# Patient Record
Sex: FEMALE | Race: WHITE | NOT HISPANIC OR LATINO | Employment: FULL TIME | URBAN - METROPOLITAN AREA
[De-identification: names, ages, dates, MRNs, and addresses within clinical notes are randomized per-mention and may not be internally consistent; named-entity substitution may affect disease eponyms.]

---

## 2023-11-01 ENCOUNTER — OFFICE VISIT (OUTPATIENT)
Dept: CARDIOLOGY | Facility: CLINIC | Age: 38
End: 2023-11-01
Payer: COMMERCIAL

## 2023-11-01 VITALS
HEART RATE: 96 BPM | DIASTOLIC BLOOD PRESSURE: 72 MMHG | WEIGHT: 132.25 LBS | BODY MASS INDEX: 22.58 KG/M2 | SYSTOLIC BLOOD PRESSURE: 98 MMHG | HEIGHT: 64 IN

## 2023-11-01 DIAGNOSIS — R42 DIZZINESS: Primary | ICD-10-CM

## 2023-11-01 PROCEDURE — 3008F PR BODY MASS INDEX (BMI) DOCUMENTED: ICD-10-PCS | Mod: CPTII,S$GLB,, | Performed by: INTERNAL MEDICINE

## 2023-11-01 PROCEDURE — 3074F PR MOST RECENT SYSTOLIC BLOOD PRESSURE < 130 MM HG: ICD-10-PCS | Mod: CPTII,S$GLB,, | Performed by: INTERNAL MEDICINE

## 2023-11-01 PROCEDURE — 3074F SYST BP LT 130 MM HG: CPT | Mod: CPTII,S$GLB,, | Performed by: INTERNAL MEDICINE

## 2023-11-01 PROCEDURE — 1159F MED LIST DOCD IN RCRD: CPT | Mod: CPTII,S$GLB,, | Performed by: INTERNAL MEDICINE

## 2023-11-01 PROCEDURE — 3078F PR MOST RECENT DIASTOLIC BLOOD PRESSURE < 80 MM HG: ICD-10-PCS | Mod: CPTII,S$GLB,, | Performed by: INTERNAL MEDICINE

## 2023-11-01 PROCEDURE — 99203 PR OFFICE/OUTPT VISIT, NEW, LEVL III, 30-44 MIN: ICD-10-PCS | Mod: S$GLB,,, | Performed by: INTERNAL MEDICINE

## 2023-11-01 PROCEDURE — 99999 PR PBB SHADOW E&M-NEW PATIENT-LVL V: ICD-10-PCS | Mod: PBBFAC,,, | Performed by: INTERNAL MEDICINE

## 2023-11-01 PROCEDURE — 99999 PR PBB SHADOW E&M-NEW PATIENT-LVL V: CPT | Mod: PBBFAC,,, | Performed by: INTERNAL MEDICINE

## 2023-11-01 PROCEDURE — 3078F DIAST BP <80 MM HG: CPT | Mod: CPTII,S$GLB,, | Performed by: INTERNAL MEDICINE

## 2023-11-01 PROCEDURE — 1159F PR MEDICATION LIST DOCUMENTED IN MEDICAL RECORD: ICD-10-PCS | Mod: CPTII,S$GLB,, | Performed by: INTERNAL MEDICINE

## 2023-11-01 PROCEDURE — 93000 EKG 12-LEAD: ICD-10-PCS | Mod: S$GLB,,, | Performed by: GENERAL PRACTICE

## 2023-11-01 PROCEDURE — 99203 OFFICE O/P NEW LOW 30 MIN: CPT | Mod: S$GLB,,, | Performed by: INTERNAL MEDICINE

## 2023-11-01 PROCEDURE — 3008F BODY MASS INDEX DOCD: CPT | Mod: CPTII,S$GLB,, | Performed by: INTERNAL MEDICINE

## 2023-11-01 PROCEDURE — 93000 ELECTROCARDIOGRAM COMPLETE: CPT | Mod: S$GLB,,, | Performed by: GENERAL PRACTICE

## 2023-11-01 RX ORDER — SOLIFENACIN SUCCINATE 5 MG/1
5 TABLET, FILM COATED ORAL
COMMUNITY
Start: 2023-02-17 | End: 2024-02-17

## 2023-11-01 RX ORDER — LANOLIN ALCOHOL/MO/W.PET/CERES
CREAM (GRAM) TOPICAL
COMMUNITY

## 2023-11-01 RX ORDER — ARIPIPRAZOLE 10 MG/1
TABLET ORAL
COMMUNITY

## 2023-11-01 RX ORDER — FAMOTIDINE 40 MG/1
TABLET, FILM COATED ORAL
COMMUNITY

## 2023-11-01 RX ORDER — BUPROPION HYDROCHLORIDE 150 MG/1
TABLET ORAL
COMMUNITY

## 2023-11-01 RX ORDER — DEXTROAMPHETAMINE SACCHARATE, AMPHETAMINE ASPARTATE MONOHYDRATE, DEXTROAMPHETAMINE SULFATE, AMPHETAMINE SULFATE 6.25; 6.25; 6.25; 6.25 MG/1; MG/1; MG/1; MG/1
CAPSULE, EXTENDED RELEASE ORAL
COMMUNITY

## 2023-11-01 RX ORDER — TIZANIDINE 4 MG/1
4 TABLET ORAL NIGHTLY PRN
COMMUNITY
Start: 2023-02-10

## 2023-11-01 RX ORDER — SUMATRIPTAN 50 MG/1
TABLET, FILM COATED ORAL
COMMUNITY

## 2023-11-01 RX ORDER — ESCITALOPRAM OXALATE 20 MG/1
40 TABLET ORAL NIGHTLY
COMMUNITY

## 2023-11-01 RX ORDER — TIRZEPATIDE 7.5 MG/.5ML
INJECTION, SOLUTION SUBCUTANEOUS
COMMUNITY
Start: 2023-10-16

## 2023-11-01 NOTE — PROGRESS NOTES
"Subjective:    Patient ID:  Catarina Oliva is a 38 y.o. female patient here for evaluation Dizziness and Pre Syncope      History of Present Illness:     38-year-old female came here for evaluation of dizziness and near syncopal episodes.  Patient with a past medical history of bipolar disorder, ADHD, PE currently not on anticoagulation.  Patient was following up with cardiology in [facility].  She had recent follow up and echocardiogram, cardiac monitor and blood work was ordered.  She had a cardiac telemetry in August which showed sinus tachycardia and possible SVT and cardiac monitor was recently reordered but she did not finish the test completely and will be returning it soon for interpretation.  She has postural dizziness for last few months mainly when standing from sitting/lying down position.  Blood pressure usually runs low at baseline.  She had 1 episode of syncope because of the dizziness couple of months ago.  Denies any history of heart disease in the past.  she denied any palpitations.        Review of patient's allergies indicates:   Allergen Reactions    Chlorphen-phenyleph-hydrocodon Itching and Other (See Comments)     Hallucinates    Etonogestrel-ethinyl estradiol Other (See Comments)     nuvaring      Meperidine Hives    Fluoxetine Other (See Comments) and Palpitations     Rapid heart rate      Sertraline hcl Other (See Comments) and Palpitations     Rapid heart rate      Silicone Dermatitis and Rash       No past medical history on file.  No past surgical history on file.  Social History     Tobacco Use    Smoking status: Never     Passive exposure: Never    Smokeless tobacco: Never        Review of Systems   Negative except as mentioned in HPI         Objective        Vitals:    11/01/23 1149   BP: 98/72   Pulse: 96       LIPIDS - LAST 2   No results found for: "CHOL", "HDL", "LDLCALC", "TRIG", "CHOLHDL"    CBC - LAST 2  No results found for: "WBC", "RBC", "HGB", "HCT", "MCV", " ""MCH", "MCHC", "RDW", "PLT", "MPV", "GRAN", "LYMPH", "MONO", "BASO", "NRBC"    CHEMISTRY & LIVER FUNCTION - LAST 2  No results found for: "NA", "K", "CL", "CO2", "ANIONGAP", "BUN", "CREATININE", "GLU", "CALCIUM", "PH", "MG", "ALBUMIN", "PROT", "ALKPHOS", "ALT", "AST", "BILITOT"     CARDIAC PROFILE - LAST 2  No results found for: "BNP", "CPK", "CPKMB", "LDH", "TROPONINI", "TROPONINIHS"     COAGULATION - LAST 2  No results found for: "LABPT", "INR", "APTT"    ENDOCRINE & PSA - LAST 2  No results found for: "HGBA1C", "MICROALBUR", "TSH", "PROCAL", "PSA"     ECHOCARDIOGRAM RESULTS  No results found for this or any previous visit.      CURRENT/PREVIOUS VISIT EKG  No results found for this or any previous visit.  No valid procedures specified.   No results found for this or any previous visit.    No valid procedures specified.          PREVIOUS STRESS TEST              PREVIOUS ANGIOGRAM        PHYSICAL EXAM    CONSTITUTIONAL: Well built, well nourished in no apparent distress  HEENT: No pallor  NECK: no JVD  LUNGS: CTA b/l  HEART: regular rate and rhythm, S1, S2 normal, no murmur   ABDOMEN: soft  EXTREMITIES: No edema  NEURO: AAO X 3   SKIN:  No rash  Psych:  Normal affect    I HAVE REVIEWED :    The vital signs, nurses notes, and all the pertinent radiology and labs.        Current Outpatient Medications   Medication Instructions    ARIPiprazole (ABILIFY) 10 MG Tab No dose, route, or frequency recorded.    buPROPion (WELLBUTRIN XL) 150 MG TB24 tablet bupropion HCl  mg 24 hr tablet, extended release    dextroamphetamine-amphetamine (MYDAYIS) 25 mg CT24 Mydayis 25 mg capsule extended release 24 hr   TAKE 1 CAPSULE BY MOUTH EVERY DAY IN THE MORNING    EScitalopram oxalate (LEXAPRO) 40 mg, Oral, Nightly    famotidine (PEPCID) 40 MG tablet famotidine 40 mg tablet   Take 1 tablet every day by oral route in the morning.    magnesium oxide (MAG-OX) 400 mg (241.3 mg magnesium) tablet No dose, route, or frequency recorded. "    MOUNJARO 7.5 mg/0.5 mL PnIj Subcutaneous    solifenacin (VESICARE) 5 mg, Oral    sumatriptan (IMITREX) 50 MG tablet Imitrex 50 mg tablet   Take 1 tablet at the onset of headache, repeat the dose in 2 hours if symptoms persist, no more than 2 tabs in 24 hours    tiZANidine (ZANAFLEX) 4 mg, Oral, Nightly PRN        ECG reviewed by me: NSR, low voltage   Assessment & Plan     38-year-old female came here for evaluation of dizziness and near syncopal episodes.  Patient with a past medical history of bipolar disorder, ADHD, PE currently not on anticoagulation.  Patient was following up with cardiology in [facility].  She had recent follow up and echocardiogram, cardiac monitor and blood work was ordered.  She had a cardiac telemetry in August which showed sinus tachycardia and possible SVT and cardiac monitor was recently reordered but she did not finish the test completely and will be returning it soon for interpretation.  She has postural dizziness for last few months mainly when standing from sitting/lying down position.  Blood pressure usually runs low at baseline.  She had 1 episode of syncope because of the dizziness couple of months ago.  Denies any history of heart disease in the past.  she denied any palpitations.    Near syncopal episodes-appears to be postural and blood pressure related  Orthostatics negative in the clinic today.  However blood pressure on the lower side.  Advised to avoid dehydration and to avoid sudden standing from sitting/lying down position.  Advised liberal salt intake given the low blood pressure.  Echocardiogram to rule out any structural heart disease  Pending interpretation of 2nd cardiac monitor.  Advised patient to bring copies during next visit    Follow up in about 2 months (around 1/1/2024).

## 2023-12-13 ENCOUNTER — HOSPITAL ENCOUNTER (OUTPATIENT)
Dept: CARDIOLOGY | Facility: HOSPITAL | Age: 38
Discharge: HOME | End: 2023-12-13
Attending: INTERNAL MEDICINE
Payer: COMMERCIAL

## 2023-12-13 VITALS — WEIGHT: 132 LBS | HEIGHT: 64 IN | BODY MASS INDEX: 22.53 KG/M2

## 2023-12-13 DIAGNOSIS — R42 DIZZINESS: ICD-10-CM

## 2023-12-13 PROCEDURE — 93306 TTE W/DOPPLER COMPLETE: CPT | Mod: 26,,, | Performed by: INTERNAL MEDICINE

## 2023-12-13 PROCEDURE — 93306 TTE W/DOPPLER COMPLETE: CPT

## 2023-12-13 PROCEDURE — 93306 ECHO (CUPID ONLY): ICD-10-PCS | Mod: 26,,, | Performed by: INTERNAL MEDICINE

## 2023-12-14 LAB
AORTIC ROOT ANNULUS: 2.8 CM
AORTIC VALVE CUSP SEPERATION: 1.7 CM
AV INDEX (PROSTH): 1.06
AV MEAN GRADIENT: 3 MMHG
AV PEAK GRADIENT: 5 MMHG
AV VALVE AREA BY VELOCITY RATIO: 2.29 CM²
AV VALVE AREA: 3.01 CM²
AV VELOCITY RATIO: 0.81
BSA FOR ECHO PROCEDURE: 1.64 M2
CV ECHO LV RWT: 0.41 CM
DOP CALC AO PEAK VEL: 1.09 M/S
DOP CALC AO VTI: 19.2 CM
DOP CALC LVOT AREA: 2.8 CM2
DOP CALC LVOT DIAMETER: 1.9 CM
DOP CALC LVOT PEAK VEL: 0.88 M/S
DOP CALC LVOT STROKE VOLUME: 57.81 CM3
DOP CALCLVOT PEAK VEL VTI: 20.4 CM
E WAVE DECELERATION TIME: 190 MSEC
E/A RATIO: 1.03
E/E' RATIO: 4.81 M/S
ECHO LV POSTERIOR WALL: 0.79 CM (ref 0.6–1.1)
FRACTIONAL SHORTENING: 40 % (ref 28–44)
INTERVENTRICULAR SEPTUM: 0.75 CM (ref 0.6–1.1)
IVRT: 90 MSEC
LEFT ATRIUM SIZE: 2.7 CM
LEFT INTERNAL DIMENSION IN SYSTOLE: 2.3 CM (ref 2.1–4)
LEFT VENTRICLE DIASTOLIC VOLUME INDEX: 38.96 ML/M2
LEFT VENTRICLE DIASTOLIC VOLUME: 63.9 ML
LEFT VENTRICLE MASS INDEX: 51 G/M2
LEFT VENTRICLE SYSTOLIC VOLUME INDEX: 11 ML/M2
LEFT VENTRICLE SYSTOLIC VOLUME: 18.1 ML
LEFT VENTRICULAR INTERNAL DIMENSION IN DIASTOLE: 3.85 CM (ref 3.5–6)
LEFT VENTRICULAR MASS: 83.4 G
LV LATERAL E/E' RATIO: 3.82 M/S
LV SEPTAL E/E' RATIO: 6.5 M/S
LVOT MG: 2 MMHG
LVOT MV: 0.57 CM/S
MV PEAK A VEL: 0.63 M/S
MV PEAK E VEL: 0.65 M/S
MV STENOSIS PRESSURE HALF TIME: 56 MS
MV VALVE AREA P 1/2 METHOD: 3.93 CM2
PISA TR MAX VEL: 1.72 M/S
RA PRESSURE ESTIMATED: 3 MMHG
RIGHT VENTRICULAR END-DIASTOLIC DIMENSION: 1.95 CM
RV TB RVSP: 5 MMHG
TDI LATERAL: 0.17 M/S
TDI SEPTAL: 0.1 M/S
TDI: 0.14 M/S
TR MAX PG: 12 MMHG
TV REST PULMONARY ARTERY PRESSURE: 15 MMHG
Z-SCORE OF LEFT VENTRICULAR DIMENSION IN END DIASTOLE: -1.81
Z-SCORE OF LEFT VENTRICULAR DIMENSION IN END SYSTOLE: -1.73

## 2024-01-08 ENCOUNTER — OFFICE VISIT (OUTPATIENT)
Dept: CARDIOLOGY | Facility: CLINIC | Age: 39
End: 2024-01-08
Payer: COMMERCIAL

## 2024-01-08 VITALS
HEIGHT: 64 IN | SYSTOLIC BLOOD PRESSURE: 101 MMHG | BODY MASS INDEX: 22.2 KG/M2 | DIASTOLIC BLOOD PRESSURE: 76 MMHG | WEIGHT: 130.06 LBS | HEART RATE: 121 BPM

## 2024-01-08 DIAGNOSIS — R42 DIZZINESS: Primary | ICD-10-CM

## 2024-01-08 PROCEDURE — 1159F MED LIST DOCD IN RCRD: CPT | Mod: CPTII,S$GLB,, | Performed by: INTERNAL MEDICINE

## 2024-01-08 PROCEDURE — 99999 PR PBB SHADOW E&M-EST. PATIENT-LVL V: CPT | Mod: PBBFAC,,, | Performed by: INTERNAL MEDICINE

## 2024-01-08 PROCEDURE — 93000 ELECTROCARDIOGRAM COMPLETE: CPT | Mod: S$GLB,,, | Performed by: GENERAL PRACTICE

## 2024-01-08 PROCEDURE — 3008F BODY MASS INDEX DOCD: CPT | Mod: CPTII,S$GLB,, | Performed by: INTERNAL MEDICINE

## 2024-01-08 PROCEDURE — 3078F DIAST BP <80 MM HG: CPT | Mod: CPTII,S$GLB,, | Performed by: INTERNAL MEDICINE

## 2024-01-08 PROCEDURE — 99214 OFFICE O/P EST MOD 30 MIN: CPT | Mod: S$GLB,,, | Performed by: INTERNAL MEDICINE

## 2024-01-08 PROCEDURE — 1160F RVW MEDS BY RX/DR IN RCRD: CPT | Mod: CPTII,S$GLB,, | Performed by: INTERNAL MEDICINE

## 2024-01-08 PROCEDURE — 3074F SYST BP LT 130 MM HG: CPT | Mod: CPTII,S$GLB,, | Performed by: INTERNAL MEDICINE

## 2024-01-08 RX ORDER — MIDODRINE HYDROCHLORIDE 2.5 MG/1
2.5 TABLET ORAL 3 TIMES DAILY
Qty: 90 TABLET | Refills: 3 | Status: SHIPPED | OUTPATIENT
Start: 2024-01-08 | End: 2025-01-07

## 2024-01-08 RX ORDER — LISDEXAMFETAMINE DIMESYLATE 50 MG/1
CAPSULE ORAL
COMMUNITY
Start: 2023-12-12

## 2024-01-08 NOTE — PROGRESS NOTES
Subjective:    Patient ID:  Catarina Oliva is a 38 y.o. female patient here for evaluation Results and Dizziness      Follow up visit 01/08/2024:  Still has intermittent episodes of dizziness with position changes.  Had echocardiogram.  Denies other symptoms.  Had vestibular therapy recently and was told that her dizziness is not from vertigo.    History of Present Illness during initial clinic visit:     38-year-old female came here for evaluation of dizziness and near syncopal episodes.  Patient with a past medical history of bipolar disorder, ADHD, PE currently not on anticoagulation.  Patient was following up with cardiology in [facility].  She had recent follow up and echocardiogram, cardiac monitor and blood work was ordered.  She had a cardiac telemetry in August which showed sinus tachycardia and possible SVT and cardiac monitor was recently reordered but she did not finish the test completely and will be returning it soon for interpretation.  She has postural dizziness for last few months mainly when standing from sitting/lying down position.  Blood pressure usually runs low at baseline.  She had 1 episode of syncope because of the dizziness couple of months ago.  Denies any history of heart disease in the past.  she denied any palpitations.        Review of patient's allergies indicates:   Allergen Reactions    Chlorphen-phenyleph-hydrocodon Itching and Other (See Comments)     Hallucinates    Etonogestrel-ethinyl estradiol Other (See Comments)     nuvaring      Meperidine Hives    Fluoxetine Other (See Comments) and Palpitations     Rapid heart rate      Sertraline hcl Other (See Comments) and Palpitations     Rapid heart rate      Silicone Dermatitis and Rash       History reviewed. No pertinent past medical history.  History reviewed. No pertinent surgical history.  Social History     Tobacco Use    Smoking status: Never     Passive exposure: Never    Smokeless tobacco: Never        Review  "of Systems   Negative except as mentioned in HPI         Objective        Vitals:    01/08/24 1357   BP: 101/76   Pulse: (!) 121       LIPIDS - LAST 2   No results found for: "CHOL", "HDL", "LDLCALC", "TRIG", "CHOLHDL"    CBC - LAST 2  No results found for: "WBC", "RBC", "HGB", "HCT", "MCV", "MCH", "MCHC", "RDW", "PLT", "MPV", "GRAN", "LYMPH", "MONO", "BASO", "NRBC"    CHEMISTRY & LIVER FUNCTION - LAST 2  No results found for: "NA", "K", "CL", "CO2", "ANIONGAP", "BUN", "CREATININE", "GLU", "CALCIUM", "PH", "MG", "ALBUMIN", "PROT", "ALKPHOS", "ALT", "AST", "BILITOT"     CARDIAC PROFILE - LAST 2  No results found for: "BNP", "CPK", "CPKMB", "LDH", "TROPONINI", "TROPONINIHS"     COAGULATION - LAST 2  No results found for: "LABPT", "INR", "APTT"    ENDOCRINE & PSA - LAST 2  No results found for: "HGBA1C", "MICROALBUR", "TSH", "PROCAL", "PSA"     ECHOCARDIOGRAM RESULTS  No results found for this or any previous visit.      CURRENT/PREVIOUS VISIT EKG  Results for orders placed or performed in visit on 11/01/23   IN OFFICE EKG 12-LEAD (to [place])    Collection Time: 11/01/23 11:46 AM    Narrative    Test Reason : R42,    Vent. Rate : 088 BPM     Atrial Rate : 088 BPM     P-R Int : 148 ms          QRS Dur : 068 ms      QT Int : 390 ms       P-R-T Axes : 040 040 027 degrees     QTc Int : 471 ms    Normal sinus rhythm  Low voltage QRS  Borderline Abnormal ECG  No previous ECGs available  Confirmed by Gunner ORLANDO, Krystian DEY ([id number]) on 12/2/2023 1:19:53 PM    Referred By: [email or web address]   SELF           Confirmed By:Krystian Martino MD     No valid procedures specified.   No results found for this or any previous visit.    No valid procedures specified.          PREVIOUS STRESS TEST              PREVIOUS ANGIOGRAM        PHYSICAL EXAM    CONSTITUTIONAL: Well built, well nourished in no apparent distress  HEENT: No pallor  NECK: no JVD  LUNGS: CTA b/l  HEART: regular rate and rhythm, S1, S2 normal, no murmur   ABDOMEN: soft  EXTREMITIES: " No edema  NEURO: AAO X 3   SKIN:  No rash  Psych:  Normal affect    I HAVE REVIEWED :    The vital signs, nurses notes, and all the pertinent radiology and labs.        Current Outpatient Medications   Medication Instructions    ARIPiprazole (ABILIFY) 10 MG Tab No dose, route, or frequency recorded.    buPROPion (WELLBUTRIN XL) 150 MG TB24 tablet bupropion HCl  mg 24 hr tablet, extended release    dextroamphetamine-amphetamine (MYDAYIS) 25 mg CT24 Mydayis 25 mg capsule extended release 24 hr   TAKE 1 CAPSULE BY MOUTH EVERY DAY IN THE MORNING    EScitalopram oxalate (LEXAPRO) 40 mg, Oral, Nightly    famotidine (PEPCID) 40 MG tablet famotidine 40 mg tablet   Take 1 tablet every day by oral route in the morning.    magnesium oxide (MAG-OX) 400 mg (241.3 mg magnesium) tablet No dose, route, or frequency recorded.    midodrine (PROAMATINE) 2.5 mg, Oral, 3 times daily    MOUNJARO 7.5 mg/0.5 mL PnIj Subcutaneous    solifenacin (VESICARE) 5 mg, Oral    sumatriptan (IMITREX) 50 MG tablet Imitrex 50 mg tablet   Take 1 tablet at the onset of headache, repeat the dose in 2 hours if symptoms persist, no more than 2 tabs in 24 hours    tiZANidine (ZANAFLEX) 4 mg, Oral, Nightly PRN    VYVANSE 50 mg capsule Oral        ECG reviewed by me:  Sinus rhythm, low-voltage  Assessment & Plan     38-year-old female came here for evaluation of dizziness and near syncopal episodes.  Patient with a past medical history of bipolar disorder, ADHD, PE currently not on anticoagulation.     Near syncopal episodes-appears to be postural and blood pressure related  Orthostatics negative in the clinic during the last clinic visit and today as well.  However symptoms are positional from lying to standing.  avoid dehydration and avoid sudden standing from sitting/lying down position.    Echocardiogram reviewed-normal LV function.  No major VHD  No arrhythmia on her 2nd cardiac monitor.  Tilt-table testing for definitive diagnosis. (Patient not  planning pregnancy in near future, had tubal removal in the past)  Advised to start midodrine 2.5 mg t.i.d. after the tilt-table testing if still has dizziness.  Outside blood work reviewed. Normal Cr.  LDL between 100-110.  Advised lifestyle modifications.  Follow up in about 3 months (around 4/8/2024).

## 2024-01-22 ENCOUNTER — PATIENT MESSAGE (OUTPATIENT)
Dept: CARDIOLOGY | Facility: CLINIC | Age: 39
End: 2024-01-22
Payer: COMMERCIAL

## 2024-04-08 ENCOUNTER — TELEPHONE (OUTPATIENT)
Dept: CARDIOLOGY | Facility: CLINIC | Age: 39
End: 2024-04-08
Payer: COMMERCIAL

## 2024-04-10 ENCOUNTER — OFFICE VISIT (OUTPATIENT)
Dept: NEPHROLOGY | Facility: CLINIC | Age: 39
End: 2024-04-10
Payer: COMMERCIAL

## 2024-04-10 ENCOUNTER — HOSPITAL ENCOUNTER (OUTPATIENT)
Dept: RADIOLOGY | Facility: HOSPITAL | Age: 39
Discharge: HOME | End: 2024-04-10
Attending: INTERNAL MEDICINE
Payer: COMMERCIAL

## 2024-04-10 VITALS
OXYGEN SATURATION: 98 % | HEIGHT: 64 IN | BODY MASS INDEX: 21.97 KG/M2 | WEIGHT: 128.69 LBS | DIASTOLIC BLOOD PRESSURE: 82 MMHG | SYSTOLIC BLOOD PRESSURE: 122 MMHG | HEART RATE: 68 BPM

## 2024-04-10 DIAGNOSIS — E11.9 TYPE 2 DIABETES MELLITUS WITHOUT COMPLICATION, WITHOUT LONG-TERM CURRENT USE OF INSULIN: ICD-10-CM

## 2024-04-10 DIAGNOSIS — F41.9 ANXIETY DISORDER, UNSPECIFIED TYPE: ICD-10-CM

## 2024-04-10 DIAGNOSIS — E28.2 POLYCYSTIC OVARIES: ICD-10-CM

## 2024-04-10 DIAGNOSIS — F31.9 BIPOLAR AFFECTIVE DISORDER, REMISSION STATUS UNSPECIFIED: ICD-10-CM

## 2024-04-10 DIAGNOSIS — M35.9 UNDIFFERENTIATED CONNECTIVE TISSUE DISEASE: ICD-10-CM

## 2024-04-10 DIAGNOSIS — R94.4 DECREASED GFR: Primary | ICD-10-CM

## 2024-04-10 DIAGNOSIS — R94.4 DECREASED GFR: ICD-10-CM

## 2024-04-10 PROBLEM — E11.65 POORLY CONTROLLED DIABETES MELLITUS: Status: ACTIVE | Noted: 2018-02-24

## 2024-04-10 PROBLEM — I26.99 PULMONARY EMBOLISM: Status: ACTIVE | Noted: 2018-02-25

## 2024-04-10 PROCEDURE — 99999 PR PBB SHADOW E&M-EST. PATIENT-LVL IV: CPT | Mod: PBBFAC,,, | Performed by: INTERNAL MEDICINE

## 2024-04-10 PROCEDURE — 1159F MED LIST DOCD IN RCRD: CPT | Mod: CPTII,S$GLB,, | Performed by: INTERNAL MEDICINE

## 2024-04-10 PROCEDURE — 99417 PROLNG OP E/M EACH 15 MIN: CPT | Mod: S$GLB,,, | Performed by: INTERNAL MEDICINE

## 2024-04-10 PROCEDURE — 99205 OFFICE O/P NEW HI 60 MIN: CPT | Mod: S$GLB,,, | Performed by: INTERNAL MEDICINE

## 2024-04-10 PROCEDURE — 3066F NEPHROPATHY DOC TX: CPT | Mod: CPTII,S$GLB,, | Performed by: INTERNAL MEDICINE

## 2024-04-10 PROCEDURE — 3008F BODY MASS INDEX DOCD: CPT | Mod: CPTII,S$GLB,, | Performed by: INTERNAL MEDICINE

## 2024-04-10 PROCEDURE — 3079F DIAST BP 80-89 MM HG: CPT | Mod: CPTII,S$GLB,, | Performed by: INTERNAL MEDICINE

## 2024-04-10 PROCEDURE — 1160F RVW MEDS BY RX/DR IN RCRD: CPT | Mod: CPTII,S$GLB,, | Performed by: INTERNAL MEDICINE

## 2024-04-10 PROCEDURE — 3044F HG A1C LEVEL LT 7.0%: CPT | Mod: CPTII,S$GLB,, | Performed by: INTERNAL MEDICINE

## 2024-04-10 PROCEDURE — 76770 US EXAM ABDO BACK WALL COMP: CPT | Mod: 26,,, | Performed by: RADIOLOGY

## 2024-04-10 PROCEDURE — 76770 US EXAM ABDO BACK WALL COMP: CPT | Mod: TC,PO

## 2024-04-10 PROCEDURE — 3074F SYST BP LT 130 MM HG: CPT | Mod: CPTII,S$GLB,, | Performed by: INTERNAL MEDICINE

## 2024-04-10 RX ORDER — BUPROPION HYDROCHLORIDE 150 MG/1
3 TABLET ORAL DAILY
COMMUNITY

## 2024-04-10 RX ORDER — ALBUTEROL SULFATE 90 UG/1
AEROSOL, METERED RESPIRATORY (INHALATION)
COMMUNITY

## 2024-04-10 RX ORDER — TROSPIUM CHLORIDE 20 MG/1
20 TABLET, FILM COATED ORAL
COMMUNITY
Start: 2024-02-20 | End: 2025-02-19

## 2024-04-10 RX ORDER — BUTALBITAL, ACETAMINOPHEN AND CAFFEINE 50; 325; 40 MG/1; MG/1; MG/1
TABLET ORAL
COMMUNITY

## 2024-04-10 RX ORDER — ERGOCALCIFEROL 1.25 MG/1
1 CAPSULE ORAL
COMMUNITY

## 2024-04-10 RX ORDER — ONDANSETRON 8 MG/1
TABLET, ORALLY DISINTEGRATING ORAL
COMMUNITY

## 2024-04-10 RX ORDER — MUPIROCIN 20 MG/G
OINTMENT TOPICAL
COMMUNITY

## 2024-04-10 RX ORDER — LINACLOTIDE 145 UG/1
CAPSULE, GELATIN COATED ORAL
COMMUNITY

## 2024-04-10 RX ORDER — FLUTICASONE PROPIONATE 50 MCG
SPRAY, SUSPENSION (ML) NASAL
COMMUNITY

## 2024-04-10 RX ORDER — CETIRIZINE HYDROCHLORIDE 10 MG/1
10 TABLET ORAL DAILY
COMMUNITY

## 2024-04-10 NOTE — PATIENT INSTRUCTIONS
1--Cystatin C blood   24 hour urine collection   - more information on filtering ability of kidneys     2--kidney ultrasound    3--check for other diseases which can affect kidney function

## 2024-04-10 NOTE — PROGRESS NOTES
Subjective:      Patient ID: Catarina Oliva is a 38 y.o. White female who presents for new evaluation of Consult    HPI    April 2024  She is referred by Family Medicine for decreased GFR, sCr has steadily increased over past few years 0.7 -1 in 2020 then 1.1>>1.2>>1.3mg/dL most recent   PE dx 2018, with DM2 was preDM prior (pre pregnancy) then during pregnancy glucose normalized (7yo son doing well) a1c 7.3 was highest, was on Ozempic, then Mounjaro, lost 70-75lbs. PCOS hx  Mounjaro now a1c 4.9 on 3/14   Aunt with autoimmune DM and other autouimmune in family, MGF with CKD 4, 2 sisters, CHF 31 yo, Paternal uncles, heart disease,.   Rare NSAIDs  No recent illness  Rheumatology>>sees for positive CATHI for few years   No foamy urine  Back pain constant   Diet: cooks most nights (carb, meat, veggie), sugary yogurt, biscuits from [employer], weekends fast food  LE edema, none  Overweight X 2008 Seroquel caused hyperglycemia, pregnancy gain, peak weight 198lbs now 128lbs  Childhood>>pretty healthy   History of constipation worse on mounjaro, uses laxatives  Fluid intake>>water   Zyrtec for AR, Tylenol  Uterine ablation 3-4 years ago no further menses   Eye doctor>>MAy 2023 double vision, no dark areas  BPs run low 100's/ 60-70's dizziness on occasion  Frequent urination, on [place] for OAB        Review of Systems   Constitutional:  Negative for activity change and appetite change.   HENT:  Negative for facial swelling.    Respiratory:  Negative for shortness of breath.    Cardiovascular:  Negative for leg swelling.   Gastrointestinal:  Positive for constipation.   Genitourinary:  Positive for frequency. Negative for difficulty urinating.   Musculoskeletal:  Positive for back pain. Negative for gait problem.   Allergic/Immunologic: Positive for immunocompromised state (DM2).   Psychiatric/Behavioral:  Negative for confusion and decreased concentration.       Objective:     Physical Exam  Vitals and nursing note reviewed.    Constitutional:       General: She is not in acute distress.     Appearance: She is normal weight.   Cardiovascular:      Rate and Rhythm: Normal rate.   Pulmonary:      Effort: Pulmonary effort is normal. No respiratory distress.      Breath sounds: No wheezing or rales.   Abdominal:      General: There is no distension.   Musculoskeletal:      Right lower leg: No edema.   Neurological:      Mental Status: She is alert and oriented to person, place, and time.   Psychiatric:         Mood and Affect: Mood normal.     Assessment:     1. Decreased GFR    2. Undifferentiated connective tissue disease    3. Type 2 diabetes mellitus without complication, without long-term current use of insulin    4. Polycystic ovaries    5. Bipolar affective disorder, remission status unspecified    6. Anxiety disorder, unspecified type       Plan:     No obvious reason for decreasing GFR but possibilities include previously poorly controlled DM2 and previous obesity  Will work up with a few select serologies, urine studies and a dedicated renal u/s   Use of Mounjaro is low on list of possibilities of slowly progressive decline in GFR  We discussed possible kidney biopsy     Plan:  1--Cystatin C and 24 hour urine collection for creatinine clearance     2--dedicated kidney ultrasound    3--check for other diseases which can affect kidney function      Labs and renal u/s  RTC to review results   80 minutes of total time spent on the encounter, which includes face to face time and non-face to face time preparing to see the patient (eg, review of tests), Obtaining and/or reviewing separately obtained history, Documenting clinical information in the electronic or other health record, Independently interpreting results (not separately reported) and communicating results to the patient/family/caregiver, or Care coordination (not separately reported).

## 2024-04-12 ENCOUNTER — OFFICE VISIT (OUTPATIENT)
Dept: CARDIOLOGY | Facility: CLINIC | Age: 39
End: 2024-04-12
Payer: COMMERCIAL

## 2024-04-12 ENCOUNTER — LAB VISIT (OUTPATIENT)
Dept: LAB | Facility: HOSPITAL | Age: 39
End: 2024-04-12
Attending: INTERNAL MEDICINE
Payer: COMMERCIAL

## 2024-04-12 ENCOUNTER — PATIENT MESSAGE (OUTPATIENT)
Dept: NEPHROLOGY | Facility: CLINIC | Age: 39
End: 2024-04-12
Payer: COMMERCIAL

## 2024-04-12 VITALS
WEIGHT: 123.44 LBS | SYSTOLIC BLOOD PRESSURE: 93 MMHG | BODY MASS INDEX: 21.07 KG/M2 | HEART RATE: 100 BPM | HEIGHT: 64 IN | DIASTOLIC BLOOD PRESSURE: 68 MMHG

## 2024-04-12 DIAGNOSIS — E11.9 TYPE 2 DIABETES MELLITUS WITHOUT COMPLICATION, WITHOUT LONG-TERM CURRENT USE OF INSULIN: Primary | ICD-10-CM

## 2024-04-12 DIAGNOSIS — R94.4 DECREASED GFR: ICD-10-CM

## 2024-04-12 DIAGNOSIS — R42 DIZZINESS: Primary | ICD-10-CM

## 2024-04-12 LAB
POTASSIUM 24H UR-SRATE: 0.8 MMOL/HR (ref 1–5)
POTASSIUM UR-SCNC: 12 MMOL/L (ref 15–95)
POTASSIUM URINE (MMOL/SPEC): 18.6 MMOL/SPEC
PROT 24H UR-MRATE: 62 MG/SPEC (ref 6–100)
PROT UR-MCNC: 4 MG/DL (ref 0–15)
SODIUM 24H UR-SRATE: 5.8 MMOL/HR (ref 2–9)
SODIUM UR-SCNC: 90 MMOL/L (ref 20–250)
SODIUM URINE (MMOL/SPEC): 140 MMOL/SPEC
URINE COLLECTION DURATION: 24 HR
URINE VOLUME: 1550 ML

## 2024-04-12 PROCEDURE — 3066F NEPHROPATHY DOC TX: CPT | Mod: CPTII,S$GLB,, | Performed by: INTERNAL MEDICINE

## 2024-04-12 PROCEDURE — 84133 ASSAY OF URINE POTASSIUM: CPT | Performed by: INTERNAL MEDICINE

## 2024-04-12 PROCEDURE — 1160F RVW MEDS BY RX/DR IN RCRD: CPT | Mod: CPTII,S$GLB,, | Performed by: INTERNAL MEDICINE

## 2024-04-12 PROCEDURE — 3008F BODY MASS INDEX DOCD: CPT | Mod: CPTII,S$GLB,, | Performed by: INTERNAL MEDICINE

## 2024-04-12 PROCEDURE — 99214 OFFICE O/P EST MOD 30 MIN: CPT | Mod: S$GLB,,, | Performed by: INTERNAL MEDICINE

## 2024-04-12 PROCEDURE — 99999 PR PBB SHADOW E&M-EST. PATIENT-LVL IV: CPT | Mod: PBBFAC,,, | Performed by: INTERNAL MEDICINE

## 2024-04-12 PROCEDURE — 84300 ASSAY OF URINE SODIUM: CPT | Performed by: INTERNAL MEDICINE

## 2024-04-12 PROCEDURE — 3044F HG A1C LEVEL LT 7.0%: CPT | Mod: CPTII,S$GLB,, | Performed by: INTERNAL MEDICINE

## 2024-04-12 PROCEDURE — 3078F DIAST BP <80 MM HG: CPT | Mod: CPTII,S$GLB,, | Performed by: INTERNAL MEDICINE

## 2024-04-12 PROCEDURE — 3074F SYST BP LT 130 MM HG: CPT | Mod: CPTII,S$GLB,, | Performed by: INTERNAL MEDICINE

## 2024-04-12 PROCEDURE — 1159F MED LIST DOCD IN RCRD: CPT | Mod: CPTII,S$GLB,, | Performed by: INTERNAL MEDICINE

## 2024-04-12 PROCEDURE — 84156 ASSAY OF PROTEIN URINE: CPT | Performed by: INTERNAL MEDICINE

## 2024-04-12 RX ORDER — MIDODRINE HYDROCHLORIDE 2.5 MG/1
2.5 TABLET ORAL
Qty: 90 TABLET | Refills: 3 | Status: SHIPPED | OUTPATIENT
Start: 2024-04-12 | End: 2024-08-10

## 2024-04-12 NOTE — TELEPHONE ENCOUNTER
Patient's cardiologist wants her to ask if she can safely take midodrine for dizzy spells. Please advise.

## 2024-04-12 NOTE — PROGRESS NOTES
Subjective:    Patient ID:  Catarina Oliva is a 38 y.o. female patient here for evaluation Results (Tilt Table)    Follow up Visit 04/12/2024:  Had tilt-table testing.  Still has intermittent postural dizziness.  Denies syncope.  Denies any other symptoms.  She is following up with Nephrology due to steadily declining GFR over the years.    Follow up visit 01/08/2024:  Still has intermittent episodes of dizziness with position changes.  Had echocardiogram.  Denies other symptoms.  Had vestibular therapy recently and was told that her dizziness is not from vertigo.    History of Present Illness during initial clinic visit:     38-year-old female came here for evaluation of dizziness and near syncopal episodes.  Patient with a past medical history of bipolar disorder, ADHD, PE currently not on anticoagulation.  Patient was following up with cardiology in [facility].  She had recent follow up and echocardiogram, cardiac monitor and blood work was ordered.  She had a cardiac telemetry in August which showed sinus tachycardia and possible SVT and cardiac monitor was recently reordered but she did not finish the test completely and will be returning it soon for interpretation.  She has postural dizziness for last few months mainly when standing from sitting/lying down position.  Blood pressure usually runs low at baseline.  She had 1 episode of syncope because of the dizziness couple of months ago.  Denies any history of heart disease in the past.  she denied any palpitations.        Review of patient's allergies indicates:   Allergen Reactions    Codeine Itching    Sertraline Palpitations    Chlorphen-phenyleph-hydrocodon Itching and Other (See Comments)     Hallucinates    Etonogestrel-ethinyl estradiol Other (See Comments)     nuvaring      Meperidine Hives    Fluoxetine Other (See Comments) and Palpitations     Rapid heart rate      Sertraline hcl Other (See Comments) and Palpitations     Rapid heart  "rate      Silicone Dermatitis and Rash       No past medical history on file.  No past surgical history on file.  Social History     Tobacco Use    Smoking status: Never     Passive exposure: Never    Smokeless tobacco: Never        Review of Systems   Negative except as mentioned in HPI         Objective        Vitals:    04/12/24 1053   BP: 93/68   Pulse: 100       LIPIDS - LAST 2   No results found for: "CHOL", "HDL", "LDLCALC", "TRIG", "CHOLHDL"    CBC - LAST 2  No results found for: "WBC", "RBC", "HGB", "HCT", "MCV", "MCH", "MCHC", "RDW", "PLT", "MPV", "GRAN", "LYMPH", "MONO", "BASO", "NRBC"    CHEMISTRY & LIVER FUNCTION - LAST 2  No results found for: "NA", "K", "CL", "CO2", "ANIONGAP", "BUN", "CREATININE", "GLU", "CALCIUM", "PH", "MG", "ALBUMIN", "PROT", "ALKPHOS", "ALT", "AST", "BILITOT"     CARDIAC PROFILE - LAST 2  No results found for: "BNP", "CPK", "CPKMB", "LDH", "TROPONINI", "TROPONINIHS"     COAGULATION - LAST 2  No results found for: "LABPT", "INR", "APTT"    ENDOCRINE & PSA - LAST 2  Lab Results   Component Value Date    HGBA1C 5.0 04/10/2024    HGBA1C 5.5 09/28/2021        ECHOCARDIOGRAM RESULTS  No results found for this or any previous visit.      CURRENT/PREVIOUS VISIT EKG  Results for orders placed or performed in visit on 01/08/24   IN OFFICE EKG 12-LEAD (to [place])    Collection Time: 01/08/24  2:13 PM    Narrative    Test Reason : R42,    Vent. Rate : 100 BPM     Atrial Rate : 100 BPM     P-R Int : 146 ms          QRS Dur : 064 ms      QT Int : 388 ms       P-R-T Axes : 049 038 036 degrees     QTc Int : 500 ms    Normal sinus rhythm  Low voltage QRS  Cannot rule out Anterior infarct ,age undetermined  Abnormal ECG  No previous ECGs available  Confirmed by Gunner ORLANDO, Krystian DEY ([id number]) on 1/15/2024 9:50:20 PM    Referred By:             Confirmed By:Krystian Martino MD     No valid procedures specified.   No results found for this or any previous visit.    No valid procedures " specified.          PREVIOUS STRESS TEST              PREVIOUS ANGIOGRAM        PHYSICAL EXAM    CONSTITUTIONAL: Well built, well nourished in no apparent distress  HEENT: No pallor  NECK: no JVD  LUNGS: CTA b/l  HEART: regular rate and rhythm, S1, S2 normal, no murmur   ABDOMEN: soft  EXTREMITIES: No edema  NEURO: AAO X 3   SKIN:  No rash  Psych:  Normal affect    I HAVE REVIEWED :    The vital signs, nurses notes, and all the pertinent radiology and labs.        Current Outpatient Medications   Medication Instructions    albuterol (PROVENTIL/VENTOLIN HFA) 90 mcg/actuation inhaler Inhale 2 puffs every 4 hours by inhalation route as needed.    ARIPiprazole (ABILIFY) 10 MG Tab No dose, route, or frequency recorded.    buPROPion (WELLBUTRIN XL) 150 MG TB24 tablet 3 tablets, Oral, Daily    butalbital-acetaminophen-caffeine -40 mg (FIORICET, ESGIC) -40 mg per tablet Take 1 tablet every 4-6 hours by oral route as needed.    cetirizine (ZYRTEC) 10 mg, Oral, Daily    ergocalciferol (ERGOCALCIFEROL) 50,000 unit Cap 1 capsule, Oral, Twice weekly    EScitalopram oxalate (LEXAPRO) 40 mg, Oral, Nightly    famotidine (PEPCID) 40 MG tablet famotidine 40 mg tablet   Take 1 tablet every day by oral route in the morning.    fluticasone propionate (FLONASE) 50 mcg/actuation nasal spray Spray 2 sprays every day by intranasal route.    LINZESS 145 mcg Cap capsule Take 1 capsule every day by oral route.    magnesium oxide (MAG-OX) 400 mg (241.3 mg magnesium) tablet No dose, route, or frequency recorded.    midodrine (PROAMATINE) 2.5 mg, Oral, 3 times daily with meals    MOUNJARO 7.5 mg/0.5 mL PnIj Subcutaneous    mupirocin (BACTROBAN) 2 % ointment APPLY A SMALL AMOUNT TO THE AFFECTED AREA BY TOPICAL ROUTE 3 TIMES PER DAY    ondansetron (ZOFRAN-ODT) 8 MG TbDL Place 1 tablet twice a day by translingual route as needed.    sumatriptan (IMITREX) 50 MG tablet Imitrex 50 mg tablet   Take 1 tablet at the onset of headache, repeat  the dose in 2 hours if symptoms persist, no more than 2 tabs in 24 hours    tiZANidine (ZANAFLEX) 4 mg, Oral, Nightly PRN    trospium (SANCTURA) 20 mg, Oral, 2 times daily before meals    VYVANSE 50 mg capsule Oral          Assessment & Plan     38-year-old female came here for evaluation of dizziness and near syncopal episodes.  Patient with a past medical history of bipolar disorder, ADHD, PE currently not on anticoagulation.     Near syncopal episodes-postural dizziness  Orthostatics negative past. However symptoms are positional from lying to standing.  Advised avoid dehydration and avoid sudden standing from sitting/lying down position.    Echocardiogram reviewed-normal LV function.  No major VHD  No arrhythmia on her 2nd cardiac monitor.  Tilt-table testing was negative for neurally mediated syncope.  The findings were borderline positive for volume depletion and possible POTS  He is still has intermittent postural dizziness.  She is following up with the Nephrology due to steadily declining GFR over the years.  Advised to discuss with the Nephrology and  start midodrine 2.5 mg t.i.d. if okay with Nephrology.    Follow up in about 3 months (around 7/12/2024).

## 2024-04-15 ENCOUNTER — PATIENT MESSAGE (OUTPATIENT)
Dept: NEPHROLOGY | Facility: CLINIC | Age: 39
End: 2024-04-15
Payer: COMMERCIAL

## 2024-05-07 ENCOUNTER — OFFICE VISIT (OUTPATIENT)
Dept: NEPHROLOGY | Facility: CLINIC | Age: 39
End: 2024-05-07
Payer: OTHER GOVERNMENT

## 2024-05-07 VITALS — BODY MASS INDEX: 22.2 KG/M2 | WEIGHT: 130 LBS | HEIGHT: 64 IN

## 2024-05-07 DIAGNOSIS — N18.31 STAGE 3A CHRONIC KIDNEY DISEASE: Primary | ICD-10-CM

## 2024-05-07 DIAGNOSIS — N18.31 TYPE 2 DIABETES MELLITUS WITH STAGE 3A CHRONIC KIDNEY DISEASE, WITHOUT LONG-TERM CURRENT USE OF INSULIN: ICD-10-CM

## 2024-05-07 DIAGNOSIS — E11.22 TYPE 2 DIABETES MELLITUS WITH STAGE 3A CHRONIC KIDNEY DISEASE, WITHOUT LONG-TERM CURRENT USE OF INSULIN: ICD-10-CM

## 2024-05-07 DIAGNOSIS — E55.9 VITAMIN D DEFICIENCY: ICD-10-CM

## 2024-05-07 DIAGNOSIS — N32.81 OVERACTIVE BLADDER: ICD-10-CM

## 2024-05-07 DIAGNOSIS — F31.9 BIPOLAR AFFECTIVE DISORDER, REMISSION STATUS UNSPECIFIED: ICD-10-CM

## 2024-05-07 DIAGNOSIS — M35.9 UNDIFFERENTIATED CONNECTIVE TISSUE DISEASE: ICD-10-CM

## 2024-05-07 DIAGNOSIS — R94.4 DECREASED GFR: ICD-10-CM

## 2024-05-07 DIAGNOSIS — R80.9 MICROALBUMINURIA: ICD-10-CM

## 2024-05-07 DIAGNOSIS — E28.2 POLYCYSTIC OVARIES: ICD-10-CM

## 2024-05-07 DIAGNOSIS — N20.0 NEPHROLITHIASIS: ICD-10-CM

## 2024-05-07 PROCEDURE — 99417 PROLNG OP E/M EACH 15 MIN: CPT | Mod: 95,,, | Performed by: INTERNAL MEDICINE

## 2024-05-07 PROCEDURE — 99215 OFFICE O/P EST HI 40 MIN: CPT | Mod: 95,,, | Performed by: INTERNAL MEDICINE

## 2024-05-07 RX ORDER — DAPAGLIFLOZIN 5 MG/1
5 TABLET, FILM COATED ORAL DAILY
Qty: 90 TABLET | Refills: 3 | Status: SHIPPED | OUTPATIENT
Start: 2024-05-07

## 2024-05-07 RX ORDER — TIRZEPATIDE 5 MG/.5ML
INJECTION, SOLUTION SUBCUTANEOUS
COMMUNITY
Start: 2024-05-02

## 2024-05-07 RX ORDER — FLUCONAZOLE 150 MG/1
TABLET ORAL
COMMUNITY
Start: 2024-05-06

## 2024-05-07 NOTE — PROGRESS NOTES
Subjective:      Patient ID: Catarina Oliva is a 38 y.o. White female who presents for follow up evaluation of Chronic Kidney Disease    HPI    April 2024  She is referred by Family Medicine for decreased GFR, sCr has steadily increased over past few years 0.7 -1 in 2020 then 1.1>>1.2>>1.3mg/dL most recent   PE dx 2018, with DM2 was preDM prior (pre pregnancy) then during pregnancy glucose normalized (9yo son doing well) a1c 7.3 was highest, was on Ozempic, then Mounjaro, lost 70-75lbs. PCOS hx  Mounjaro now a1c 4.9 on 3/14   Aunt with autoimmune DM and other autouimmune in family, MGF with CKD 4, 2 sisters, CHF 31 yo, Paternal uncles, heart disease,.   Rare NSAIDs  No recent illness  Rheumatology>>sees for positive CATHI for few years   No foamy urine  Back pain constant   Diet: cooks most nights (carb, meat, veggie), sugary yogurt, biscuits from [employer], weekends fast food  LE edema, none  Overweight X 2008 Seroquel caused hyperglycemia, pregnancy gain, peak weight 198lbs now 128lbs  Childhood>>pretty healthy   History of constipation worse on mounjaro, uses laxatives  Fluid intake>>water   Zyrtec for AR, Tylenol  Uterine ablation 3-4 years ago no further menses   Eye doctor>>MAy 2023 double vision, no dark areas  BPs run low 100's/ 60-70's dizziness on occasion  Frequent urination, on Sanctura for OAB    May 7 2024:  The patient location is: [place]  The chief complaint leading to consultation is: CKD  Visit type: audiovisual  50 minutes of total time spent on the encounter, which includes face to face time and non-face to face time preparing to see the patient (eg, review of tests), Obtaining and/or reviewing separately obtained history, Documenting clinical information in the electronic or other health record, Independently interpreting results (not separately reported) and communicating results to the patient/family/caregiver, or Care coordination (not separately reported).   Each patient to whom he or she provides  medical services by telemedicine is:  (1) informed of the relationship between the physician and patient and the respective role of any other health care provider with respect to management of the patient; and (2) notified that he or she may decline to receive medical services by telemedicine and may withdraw from such care at any time.  Notes: Here for CKD results. Kidney stone passage? None, and does not run in her family. She has started midodrine           Review of Systems   Allergic/Immunologic: Positive for immunocompromised state.      Objective:     Physical Exam  Constitutional:       General: She is not in acute distress.  Pulmonary:      Effort: Pulmonary effort is normal. No respiratory distress.   Neurological:      Mental Status: She is alert and oriented to person, place, and time. Mental status is at baseline.   Psychiatric:         Mood and Affect: Mood normal.       Assessment:     1. Stage 3a chronic kidney disease    2. Decreased GFR    3. Undifferentiated connective tissue disease    4. Polycystic ovaries    5. Bipolar affective disorder, remission status unspecified    6. Type 2 diabetes mellitus with stage 3a chronic kidney disease, without long-term current use of insulin    7. Microalbuminuria--80mcg 3/2024    8. Overactive bladder    9. Nephrolithiasis         Plan:     Initial Consult:  No obvious reason for decreasing GFR but possibilities include previously poorly controlled DM2 and previous obesity  Will work up with a few select serologies, urine studies and a dedicated renal u/s   Use of Mounjaro is low on list of possibilities of slowly progressive decline in GFR  We discussed possible kidney biopsy   Plan:  1--Cystatin C and 24 hour urine collection for creatinine clearance   2--dedicated kidney ultrasound  3--check for other diseases which can affect kidney function      May 7 2024  1)- she exhibits no significant proteinuria (previous microalbumin level 80 3/2024) on 24 hour  urine collection and spot specimens, her kidney tissue is WNL on renal u/s, and of significance is stabilization of kidney function. In use of combined cystatinC and creatinine GFR equation, and separate her GFR has a wide range: 4/23/2024    CKD-EPI creatinine equation (2021) 66 mL/min/1.73m2   CKD-EPI creatinine-cystatin equation (2021) 80 mL/min/1.73m2   CKD-EPI cystatin C equation (2012) 91 mL/min/1.73m2   Not adjusted for BSA    No indications for kidney biopsy  We reviewed kidney health tips and will add Farxiga 5mg, if tolerated (discussed SEs) in 90 days then will increase to 10mg.     2) Nephrolithiasis  We dicussed continuing low sodium diet and being vigilant about increasing fluids sot hat UOP increases (1.5L 24 hours but may be underestimated a tad since she is on treatment for OAB) will require 24 hour urine stone study in the future.       RTC 6mo

## 2024-05-08 ENCOUNTER — PATIENT MESSAGE (OUTPATIENT)
Dept: NEPHROLOGY | Facility: CLINIC | Age: 39
End: 2024-05-08
Payer: OTHER GOVERNMENT

## 2024-05-08 PROBLEM — N20.0 NEPHROLITHIASIS: Status: ACTIVE | Noted: 2024-05-08

## 2024-05-08 PROBLEM — R80.9 MICROALBUMINURIA: Status: ACTIVE | Noted: 2024-05-08

## 2024-05-08 PROBLEM — N32.81 OVERACTIVE BLADDER: Status: ACTIVE | Noted: 2024-05-08

## 2024-07-15 ENCOUNTER — PATIENT MESSAGE (OUTPATIENT)
Dept: NEPHROLOGY | Facility: CLINIC | Age: 39
End: 2024-07-15
Payer: OTHER GOVERNMENT

## 2024-07-15 PROBLEM — I26.99 PULMONARY EMBOLISM: Status: RESOLVED | Noted: 2018-02-25 | Resolved: 2024-07-15

## 2024-07-15 RX ORDER — DAPAGLIFLOZIN 10 MG/1
10 TABLET, FILM COATED ORAL DAILY
Qty: 90 TABLET | Refills: 3 | Status: SHIPPED | OUTPATIENT
Start: 2024-07-15

## 2024-09-26 ENCOUNTER — PATIENT MESSAGE (OUTPATIENT)
Dept: NEPHROLOGY | Facility: CLINIC | Age: 39
End: 2024-09-26
Payer: OTHER GOVERNMENT

## 2024-11-04 ENCOUNTER — PATIENT MESSAGE (OUTPATIENT)
Dept: NEPHROLOGY | Facility: CLINIC | Age: 39
End: 2024-11-04
Payer: COMMERCIAL

## 2024-11-04 NOTE — TELEPHONE ENCOUNTER
Patient was informed we cannot see her recent labs. She will send information in her [employer] portal.

## 2024-11-19 ENCOUNTER — OFFICE VISIT (OUTPATIENT)
Dept: NEPHROLOGY | Facility: CLINIC | Age: 39
End: 2024-11-19
Payer: COMMERCIAL

## 2024-11-19 DIAGNOSIS — E11.22 TYPE 2 DIABETES MELLITUS WITH STAGE 3A CHRONIC KIDNEY DISEASE, WITHOUT LONG-TERM CURRENT USE OF INSULIN: ICD-10-CM

## 2024-11-19 DIAGNOSIS — E55.9 VITAMIN D DEFICIENCY: ICD-10-CM

## 2024-11-19 DIAGNOSIS — N18.31 TYPE 2 DIABETES MELLITUS WITH STAGE 3A CHRONIC KIDNEY DISEASE, WITHOUT LONG-TERM CURRENT USE OF INSULIN: ICD-10-CM

## 2024-11-19 DIAGNOSIS — R80.9 MICROALBUMINURIA: ICD-10-CM

## 2024-11-19 DIAGNOSIS — N18.31 STAGE 3A CHRONIC KIDNEY DISEASE: Primary | ICD-10-CM

## 2024-11-19 DIAGNOSIS — M35.9 UNDIFFERENTIATED CONNECTIVE TISSUE DISEASE: ICD-10-CM

## 2024-11-19 DIAGNOSIS — N20.0 NEPHROLITHIASIS: ICD-10-CM

## 2024-11-19 PROCEDURE — 99215 OFFICE O/P EST HI 40 MIN: CPT | Mod: 95,,, | Performed by: INTERNAL MEDICINE

## 2024-11-19 PROCEDURE — 3044F HG A1C LEVEL LT 7.0%: CPT | Mod: CPTII,95,, | Performed by: INTERNAL MEDICINE

## 2024-11-19 PROCEDURE — 1159F MED LIST DOCD IN RCRD: CPT | Mod: CPTII,95,, | Performed by: INTERNAL MEDICINE

## 2024-11-19 PROCEDURE — 3066F NEPHROPATHY DOC TX: CPT | Mod: CPTII,95,, | Performed by: INTERNAL MEDICINE

## 2024-11-19 RX ORDER — PANTOPRAZOLE SODIUM 40 MG/1
40 TABLET, DELAYED RELEASE ORAL
COMMUNITY

## 2024-11-19 RX ORDER — KETOROLAC TROMETHAMINE 10 MG/1
10 TABLET, FILM COATED ORAL EVERY 6 HOURS PRN
COMMUNITY
Start: 2024-09-23

## 2024-11-19 RX ORDER — GABAPENTIN 300 MG/1
300 CAPSULE ORAL 2 TIMES DAILY
COMMUNITY
Start: 2024-10-24 | End: 2024-12-03

## 2024-11-19 RX ORDER — BLOOD-GLUCOSE SENSOR
EACH MISCELLANEOUS
COMMUNITY
Start: 2024-11-14

## 2024-11-19 RX ORDER — FUROSEMIDE 20 MG/1
10-20 TABLET ORAL
COMMUNITY
Start: 2024-09-26 | End: 2025-09-26

## 2024-11-19 RX ORDER — DEXTROAMPHETAMINE SULFATE, DEXTROAMPHETAMINE SACCHARATE, AMPHETAMINE ASPARTATE MONOHYDRATE, AND AMPHETAMINE SULFATE 6.25; 6.25; 6.25; 6.25 MG/1; MG/1; MG/1; MG/1
CAPSULE, EXTENDED RELEASE ORAL NIGHTLY
COMMUNITY
Start: 2024-09-16

## 2024-11-19 RX ORDER — OXYBUTYNIN CHLORIDE 15 MG/1
15 TABLET, EXTENDED RELEASE ORAL
COMMUNITY

## 2024-11-19 NOTE — PROGRESS NOTES
Subjective:      Patient ID: Catarina Oliva is a 39 y.o. White female who presents for follow up evaluation of Chronic Kidney Disease    HPI    April 2024  She is referred by Family Medicine for decreased GFR, sCr has steadily increased over past few years 0.7 -1 in 2020 then 1.1>>1.2>>1.3mg/dL most recent   PE dx 2018, with DM2 was preDM prior (pre pregnancy) then during pregnancy glucose normalized (9yo son doing well) a1c 7.3 was highest, was on Ozempic, then Mounjaro, lost 70-75lbs. PCOS hx  Mounjaro now a1c 4.9 on 3/14   Aunt with autoimmune DM and other autouimmune in family, MGF with CKD 4, 2 sisters, CHF 33 yo, Paternal uncles, heart disease,.   Rare NSAIDs  No recent illness  Rheumatology>>sees for positive CATHI for few years   No foamy urine  Back pain constant   Diet: cooks most nights (carb, meat, veggie), sugary yogurt, biscuits from [employer], weekends fast food  LE edema, none  Overweight X 2008 Seroquel caused hyperglycemia, pregnancy gain, peak weight 198lbs now 128lbs  Childhood>>pretty healthy   History of constipation worse on mounjaro, uses laxatives  Fluid intake>>water   Zyrtec for AR, Tylenol  Uterine ablation 3-4 years ago no further menses   Eye doctor>>MAy 2023 double vision, no dark areas  BPs run low 100's/ 60-70's dizziness on occasion  Frequent urination, on Sanctura for OAB    May 7 2024:  The patient location is: [place]  The chief complaint leading to consultation is: CKD  Visit type: audiovisual  50 minutes of total time spent on the encounter, which includes face to face time and non-face to face time preparing to see the patient (eg, review of tests), Obtaining and/or reviewing separately obtained history, Documenting clinical information in the electronic or other health record, Independently interpreting results (not separately reported) and communicating results to the patient/family/caregiver, or Care coordination (not separately reported).   Each patient to whom he or she provides  medical services by telemedicine is:  (1) informed of the relationship between the physician and patient and the respective role of any other health care provider with respect to management of the patient; and (2) notified that he or she may decline to receive medical services by telemedicine and may withdraw from such care at any time.  Notes: Here for CKD results. Kidney stone passage? None, and does not run in her family. She has started midodrine     Nov 2024:  The patient location is: [place]  The chief complaint leading to consultation is: CKD  Visit type: audiovisual  50 minutes of total time spent on the encounter, which includes face to face time and non-face to face time preparing to see the patient (eg, review of tests), Obtaining and/or reviewing separately obtained history, Documenting clinical information in the electronic or other health record, Independently interpreting results (not separately reported) and communicating results to the patient/family/caregiver, or Care coordination (not separately reported).   Each patient to whom he or she provides medical services by telemedicine is:  (1) informed of the relationship between the physician and patient and the respective role of any other health care provider with respect to management of the patient; and (2) notified that he or she may decline to receive medical services by telemedicine and may withdraw from such care at any time.  Notes: Received steroid injection in neck this morning. Off Mounjaro and off midodrine. ( asked her to stop taking) She has gained weight since stopping.       Review of Systems   Allergic/Immunologic: Positive for immunocompromised state.      Objective:     Physical Exam  Constitutional:       General: She is not in acute distress.  Pulmonary:      Effort: Pulmonary effort is normal. No respiratory distress.   Neurological:      Mental Status: She is alert and oriented to person, place, and time. Mental status is at  baseline.   Psychiatric:         Mood and Affect: Mood normal.       Assessment:     1. Stage 3a chronic kidney disease    2. Undifferentiated connective tissue disease    3. Vitamin D deficiency    4. Nephrolithiasis    5. Microalbuminuria--80mcg 3/2024    6. Type 2 diabetes mellitus with stage 3a chronic kidney disease, without long-term current use of insulin           Plan:     Initial Consult:  No obvious reason for decreasing GFR but possibilities include previously poorly controlled DM2 and previous obesity  Will work up with a few select serologies, urine studies and a dedicated renal u/s   Use of Mounjaro is low on list of possibilities of slowly progressive decline in GFR  We discussed possible kidney biopsy   Plan:  1--Cystatin C and 24 hour urine collection for creatinine clearance   2--dedicated kidney ultrasound  3--check for other diseases which can affect kidney function      May 7 2024  1)- she exhibits no significant proteinuria (previous microalbumin level 80 3/2024) on 24 hour urine collection and spot specimens, her kidney tissue is WNL on renal u/s, and of significance is stabilization of kidney function. In use of combined cystatinC and creatinine GFR equation, and separate her GFR has a wide range: 4/23/2024    CKD-EPI creatinine equation (2021) 66 mL/min/1.73m2   CKD-EPI creatinine-cystatin equation (2021) 80 mL/min/1.73m2   CKD-EPI cystatin C equation (2012) 91 mL/min/1.73m2   Not adjusted for BSA    No indications for kidney biopsy  We reviewed kidney health tips and will add Farxiga 5mg, if tolerated (discussed SEs) in 90 days then will increase to 10mg.     Nov 2024  1) CKD  Stable kidney function  Continue Farxiga for treatment    2) Nephrolithiasis  Low sodium diet and push fluids as doing, fresh lemon helpful    3) Secondary HPT/Pseudohyperparathyroidism  Resume ergo 50K per week  Trend PTH    RTC 6mo

## 2024-11-26 ENCOUNTER — PATIENT MESSAGE (OUTPATIENT)
Dept: NEPHROLOGY | Facility: CLINIC | Age: 39
End: 2024-11-26
Payer: COMMERCIAL